# Patient Record
Sex: FEMALE | Race: WHITE | URBAN - METROPOLITAN AREA
[De-identification: names, ages, dates, MRNs, and addresses within clinical notes are randomized per-mention and may not be internally consistent; named-entity substitution may affect disease eponyms.]

---

## 2024-02-25 ENCOUNTER — OFFICE VISIT (OUTPATIENT)
Age: 19
End: 2024-02-25

## 2024-02-25 VITALS
RESPIRATION RATE: 19 BRPM | WEIGHT: 224 LBS | HEIGHT: 63 IN | OXYGEN SATURATION: 96 % | TEMPERATURE: 98.4 F | SYSTOLIC BLOOD PRESSURE: 132 MMHG | DIASTOLIC BLOOD PRESSURE: 82 MMHG | HEART RATE: 81 BPM | BODY MASS INDEX: 39.69 KG/M2

## 2024-02-25 DIAGNOSIS — N76.0 VAGINOSIS: ICD-10-CM

## 2024-02-25 DIAGNOSIS — R30.0 DYSURIA: Primary | ICD-10-CM

## 2024-02-25 LAB
BILIRUBIN, POC: NORMAL
BLOOD URINE, POC: NORMAL
CLARITY, POC: CLEAR
COLOR, POC: NORMAL
GLUCOSE URINE, POC: NORMAL
KETONES, POC: NORMAL
LEUKOCYTE EST, POC: NORMAL
NITRITE, POC: NORMAL
PH, POC: 6.5
PROTEIN, POC: NORMAL
SPECIFIC GRAVITY, POC: 1.01
UROBILINOGEN, POC: 0.2

## 2024-02-25 RX ORDER — PHENAZOPYRIDINE HYDROCHLORIDE 200 MG/1
200 TABLET, FILM COATED ORAL 3 TIMES DAILY PRN
Qty: 6 TABLET | Refills: 0 | Status: SHIPPED | OUTPATIENT
Start: 2024-02-25 | End: 2024-02-27

## 2024-02-25 RX ORDER — CIPROFLOXACIN 500 MG/1
500 TABLET, FILM COATED ORAL 2 TIMES DAILY
Qty: 14 TABLET | Refills: 0 | Status: SHIPPED | OUTPATIENT
Start: 2024-02-25 | End: 2024-03-03

## 2024-02-25 NOTE — PROGRESS NOTES
Penny Chris (:  2005) is a 18 y.o. female,New patient, here for evaluation of the following chief complaint(s):  Dysuria (Burning w/ urine and lower left abdominal pain. Was treated for UTI 2 months ago and the sym never subsided.)      ASSESSMENT/PLAN:  Visit Diagnoses and Associated Orders       Dysuria    -  Primary    POCT Urinalysis no Micro [leuks-, nitrates-]      Culture, Urine [pending]      ciprofloxacin (CIPRO) 500 MG tablet [69404]      phenazopyridine (PYRIDIUM) 200 MG tablet [[id number]]           Vaginosis        Vaginal Pathogens Probes *A [pending]           ORDERS WITHOUT AN ASSOCIATED DIAGNOSIS    sertraline (ZOLOFT) 50 MG tablet [[id number]]              Advised the patient that we will send urine specimen for further testing. Results are typically available in 2-3 days.  Patient stated ok to leave voice message with results.     Complete the full course of antibiotics . Eat a yogurt daily while on antibiotics to prevent yeast infection. Use a second form of birth control while on antibiotics, as antibiotics often interfere with birth control effectiveness.     Follow up with a PCP if do not improve. ER follow up required for symptoms including, but not limited to: abdominal pain, back/flank pain, nausea or vomiting, mental status change, fevers >101, dehydration, or rapid worsening symptoms Patient verbalized understanding.     SUBJECTIVE/OBJECTIVE:  C/o chills, nausea, R flank pain, burning and frequency with urination. Also w/ c/o vaginal discharge (thin, white, and foul odor)      History provided by:  Patient  Dysuria       HPI:   18 y.o. female presents with symptoms of   Urinary Tract Infection  Patient complains of dysuria, frequency, urgency, burning with urination           Vitals:    24 1600   BP: 132/82   Site: Right Upper Arm   Position: Sitting   Cuff Size: Large Adult   Pulse: 81   Resp: 19   Temp: 98.4 °F (36.9 °C)   TempSrc: Oral   SpO2: 96%   Weight: 101.6 kg (224 lb)

## 2024-02-26 LAB
CANDIDA DNA VAG QL NAA+PROBE: NORMAL
G VAGINALIS DNA SPEC QL NAA+PROBE: NORMAL
T VAGINALIS DNA VAG QL NAA+PROBE: NORMAL

## 2024-02-27 LAB
BACTERIA UR CULT: ABNORMAL
BACTERIA UR CULT: ABNORMAL
ORGANISM: ABNORMAL